# Patient Record
Sex: FEMALE | Race: WHITE | Employment: FULL TIME | URBAN - METROPOLITAN AREA
[De-identification: names, ages, dates, MRNs, and addresses within clinical notes are randomized per-mention and may not be internally consistent; named-entity substitution may affect disease eponyms.]

---

## 2021-10-13 ENCOUNTER — TELEPHONE (OUTPATIENT)
Dept: NUTRITION | Age: 36
End: 2021-10-13

## 2021-10-13 NOTE — TELEPHONE ENCOUNTER
Nutrition Counseling: Contacted pt regarding referral. See notes documented in Nutrition Counseling Referral for details. Left several voice mails for pt but did not return any phone calls. No further follow-up contact from pt. Will close referral for this office and notify referring provider.     [address] [facility]  [phone]